# Patient Record
Sex: FEMALE | Race: BLACK OR AFRICAN AMERICAN | URBAN - METROPOLITAN AREA
[De-identification: names, ages, dates, MRNs, and addresses within clinical notes are randomized per-mention and may not be internally consistent; named-entity substitution may affect disease eponyms.]

---

## 2022-12-02 ENCOUNTER — APPOINTMENT (RX ONLY)
Dept: URBAN - METROPOLITAN AREA CLINIC 33 | Facility: CLINIC | Age: 52
Setting detail: DERMATOLOGY
End: 2022-12-02

## 2022-12-02 DIAGNOSIS — Z41.9 ENCOUNTER FOR PROCEDURE FOR PURPOSES OTHER THAN REMEDYING HEALTH STATE, UNSPECIFIED: ICD-10-CM

## 2022-12-02 PROCEDURE — ? OTHER (COSMETIC)

## 2022-12-02 NOTE — HPI: COSMETIC CONSULTATION
Additional History: Pt has lost weight rapidly and is having some sagging of her mouth, jaw and neck. She is curious what her options could be to help improve the appearance.

## 2022-12-02 NOTE — PROCEDURE: OTHER (COSMETIC)
Price (Use Numbers Only, No Special Characters Or $): 50.00
Other (Free Text): Pt is unhappy with the drooping of the sides of her mouth. She does not have back teeth currently and was advised that getting the implants she wants will help tremendously with the support of the skin to improve the appearance. Recommended to follow through with implants before chasing cosmetic alterations.
Detail Level: Zone